# Patient Record
Sex: FEMALE | Race: OTHER | HISPANIC OR LATINO | URBAN - METROPOLITAN AREA
[De-identification: names, ages, dates, MRNs, and addresses within clinical notes are randomized per-mention and may not be internally consistent; named-entity substitution may affect disease eponyms.]

---

## 2024-09-11 ENCOUNTER — EMERGENCY (EMERGENCY)
Facility: HOSPITAL | Age: 24
LOS: 1 days | Discharge: HOME | End: 2024-09-11
Attending: EMERGENCY MEDICINE
Payer: COMMERCIAL

## 2024-09-11 VITALS
WEIGHT: 180.56 LBS | RESPIRATION RATE: 18 BRPM | DIASTOLIC BLOOD PRESSURE: 84 MMHG | OXYGEN SATURATION: 100 % | HEART RATE: 125 BPM | SYSTOLIC BLOOD PRESSURE: 135 MMHG | HEIGHT: 60 IN | TEMPERATURE: 98 F

## 2024-09-11 PROCEDURE — 99284 EMERGENCY DEPT VISIT MOD MDM: CPT

## 2024-09-11 RX ORDER — ACETAMINOPHEN 325 MG/1
1000 TABLET ORAL ONCE
Refills: 0 | Status: COMPLETED | OUTPATIENT
Start: 2024-09-11 | End: 2024-09-11

## 2024-09-11 RX ORDER — SODIUM CHLORIDE 9 MG/ML
1000 INJECTION INTRAMUSCULAR; INTRAVENOUS; SUBCUTANEOUS ONCE
Refills: 0 | Status: COMPLETED | OUTPATIENT
Start: 2024-09-11 | End: 2024-09-11

## 2024-09-11 RX ADMIN — ACETAMINOPHEN 400 MILLIGRAM(S): 325 TABLET ORAL at 23:59

## 2024-09-11 RX ADMIN — SODIUM CHLORIDE 1000 MILLILITER(S): 9 INJECTION INTRAMUSCULAR; INTRAVENOUS; SUBCUTANEOUS at 23:59

## 2024-09-11 NOTE — ED PROVIDER NOTE - DISCHARGE DATE
12-Sep-2024 Unna Boot Text: An Unna boot was placed to help immobilize the limb and facilitate more rapid healing.

## 2024-09-11 NOTE — ED PROVIDER NOTE - CLINICAL SUMMARY MEDICAL DECISION MAKING FREE TEXT BOX
24 year old female no PMhx present to ED for cough, congestion, body aches. Pt reports 2 days of tactile fevers, bodyaches with associated dry cough. Pt reports assocaited HA, last time took tylenol at 1700 today with mild relief.  Pt LMP june or july. Pt denies lightheadedness, dizziness, SOB, difficulty breathing, CP, abd pain, n/v/d, dysuria, hematuria.   Symptomatic control, re-asses 24 year old female no PMhx present to ED for cough, congestion, body aches. Pt reports 2 days of tactile fevers, bodyaches with associated dry cough. Pt reports assocaited HA, last time took tylenol at 1700 today with mild relief.  Pt LMP june or july. Pt denies lightheadedness, dizziness, SOB, difficulty breathing, CP, abd pain, n/v/d, dysuria, hematuria.   Symptomatic control, re-assess  Pt reassessed, pt feeling better at this time, vss, pt able to walk, talk and vocalized plan of action. Discussed in depth and explained to pt in depth the next steps that need to be taken including proper follow up with PCP or specialists. All incidental findings were discussed with pt as well. Pt verbalized their concerns and all questions were answered. Pt understands dispo and wants discharge. Given good instructions when to return to ED, strict return precautions and importance of f/u.

## 2024-09-12 VITALS
RESPIRATION RATE: 18 BRPM | TEMPERATURE: 98 F | HEART RATE: 100 BPM | SYSTOLIC BLOOD PRESSURE: 108 MMHG | DIASTOLIC BLOOD PRESSURE: 67 MMHG | OXYGEN SATURATION: 97 %

## 2024-09-12 LAB — HCG SERPL-ACNC: <4 MIU/ML — SIGNIFICANT CHANGE UP

## 2024-09-12 PROCEDURE — 99284 EMERGENCY DEPT VISIT MOD MDM: CPT | Mod: 25

## 2024-09-12 PROCEDURE — 36415 COLL VENOUS BLD VENIPUNCTURE: CPT

## 2024-09-12 PROCEDURE — 96374 THER/PROPH/DIAG INJ IV PUSH: CPT

## 2024-09-12 PROCEDURE — 84702 CHORIONIC GONADOTROPIN TEST: CPT

## 2024-09-12 RX ORDER — IBUPROFEN 600 MG
600 TABLET ORAL ONCE
Refills: 0 | Status: COMPLETED | OUTPATIENT
Start: 2024-09-12 | End: 2024-09-12

## 2024-09-12 RX ADMIN — Medication 600 MILLIGRAM(S): at 01:26

## 2025-01-01 ENCOUNTER — EMERGENCY (EMERGENCY)
Facility: HOSPITAL | Age: 25
LOS: 1 days | Discharge: HOME | End: 2025-01-01
Attending: EMERGENCY MEDICINE
Payer: COMMERCIAL

## 2025-01-01 VITALS
OXYGEN SATURATION: 100 % | SYSTOLIC BLOOD PRESSURE: 113 MMHG | TEMPERATURE: 99 F | HEART RATE: 98 BPM | RESPIRATION RATE: 20 BRPM | DIASTOLIC BLOOD PRESSURE: 70 MMHG | WEIGHT: 174.17 LBS

## 2025-01-01 PROCEDURE — T1013: CPT

## 2025-01-01 PROCEDURE — 99284 EMERGENCY DEPT VISIT MOD MDM: CPT

## 2025-01-01 PROCEDURE — 99283 EMERGENCY DEPT VISIT LOW MDM: CPT

## 2025-01-01 RX ORDER — ONDANSETRON 4 MG/1
4 TABLET ORAL ONCE
Refills: 0 | Status: COMPLETED | OUTPATIENT
Start: 2025-01-01 | End: 2025-01-01

## 2025-01-01 RX ORDER — ONDANSETRON 4 MG/1
1 TABLET ORAL
Qty: 9 | Refills: 0
Start: 2025-01-01 | End: 2025-01-03

## 2025-01-01 RX ADMIN — ONDANSETRON 4 MILLIGRAM(S): 4 TABLET ORAL at 22:38

## 2025-01-01 NOTE — ED ADULT TRIAGE NOTE - CHIEF COMPLAINT QUOTE
Patient presents to ED with c/o diarrhea times 3 days, generalized body aches and vomiting blood that started tonight at 1900.   No meds PTA

## 2025-01-01 NOTE — ED PROVIDER NOTE - ATTENDING CONTRIBUTION TO CARE
I performed a face to face bedside interview with patient regarding history of present illness, review of symptoms and past medical history. I completed an independent physical exam.  I have discussed patient's plan of care with resident.   I agree with note as stated above including HISTORY OF PRESENT ILLNESS, HIV, PAST MEDICAL/SURGICAL/FAMILY/SOCIAL HISTORY, ALLERGIES AND HOME MEDICATIONS, REVIEW OF SYSTEMS, PHYSICAL EXAM, MEDICAL DECISION MAKING and any PROGRESS NOTES during the time I functioned as the attending physician for this patient unless otherwise noted. My brief assessment is as follows: GENERAL: No acute distress, comfortably in bed  HEENT: Atraumatic, normocephalic, non-icteric, no blood visualized in posterior oropharynx, no conjunctival pallor, moist oral mucosa   NECK: Supple, full range of motion, no observable masses  NEURO: A&Ox3, no focal deficits, moving all extremities spontaneously, no dysarthria, CN II-XII grossly intact  PSYCH: Normal affect, appropriate insight and judgment, fluent speech  LUNGS: CTAB, no wrr, non-labored breathing  HEART: RRR, no murmur appreciated  ABD: Soft, non-tender, non-distended, no appreciable masses  EXTREMITIES: Nontender, no clubbing, cyanosis, or edema  SKIN: Warm and dry, no skin tenting

## 2025-01-01 NOTE — ED PROVIDER NOTE - PATIENT PORTAL LINK FT
You can access the FollowMyHealth Patient Portal offered by [facility] by registering at the following website: [email or web address]. By joining [facility]’s FollowMyHealth portal, you will also be able to view your health information using other applications (apps) compatible with our system.

## 2025-01-01 NOTE — ED ADULT NURSE NOTE - OBJECTIVE STATEMENT
Patient discharged by provider prior to RN assessment. Pt assessed and treated by provider, refer to provider notes.  Pt medicated prior to discharge. No sings of acute distress noted, respirations even and unlabored.

## 2025-01-01 NOTE — ED PROVIDER NOTE - CARE PLAN
Principal Discharge DX:	Nausea, vomiting and diarrhea  Secondary Diagnosis:	Vomiting of fresh blood   1

## 2025-01-01 NOTE — ED PROVIDER NOTE - CLINICAL SUMMARY MEDICAL DECISION MAKING FREE TEXT BOX
24y female with no reported past medical history presents with diarrhea and hematemesis. Reports flu like symptoms, including cough and chills, and non bloody non melenic diarrhea for the past 3 days. Vomited twice this evening with minimal bright red blood. Vital stable, no acute distress, well appearing, A&Ox3, no blood visualized in posterior oropharynx, no conjunctival pallor, not dehydrated. Will give zofran for n/v. Patient ready for discharge, return precautions given.

## 2025-01-01 NOTE — ED PROVIDER NOTE - OBJECTIVE STATEMENT
24y female with no reported past medical history presents with diarrhea and hematemesis. Reports flu like symptoms, including cough and chills, and non bloody non melenic diarrhea for the past 3 days. No abd pain. Vomited twice this evening with minimal bright red blood. Reports childrens are sick as well. Denies alcohol use. Denies asprin or NASIDs use. No prior GIB history. Denies fever, dizziness, dyspnea, chest pain,, dysuria.

## 2025-01-01 NOTE — ED PROVIDER NOTE - PHYSICAL EXAMINATION
Detail Level: Zone
GENERAL: No acute distress, comfortably in bed  HEENT: Atraumatic, normocephalic, non-icteric, no blood visualized in posterior oropharynx, no conjunctival pallor, moist oral mucosa   NECK: Supple, full range of motion, no observable masses  NEURO: A&Ox3, no focal deficits, moving all extremities spontaneously, no dysarthria, CN II-XII grossly intact  PSYCH: Normal affect, appropriate insight and judgment, fluent speech  LUNGS: CTAB, no wrr, non-labored breathing  HEART: RRR, no murmur appreciated  ABD: Soft, non-tender, non-distended, no appreciable masses  EXTREMITIES: Nontender, no clubbing, cyanosis, or edema  SKIN: Warm and dry, no skin tenting

## 2025-01-01 NOTE — ED PROVIDER NOTE - NSFOLLOWUPINSTRUCTIONS_ED_ALL_ED_FT
- Please return to the emergency room if your symptoms persists or worsens  - Please follow up with your PCP within 2 days  - Rest, drink plenty of fluids.  Advance activity as tolerated.     Nausea / Vomiting    Nausea is the feeling that you have to vomit. As nausea gets worse, it can lead to vomiting. Vomiting puts you at an increased risk for dehydration. Older adults and people with other diseases or a weak immune system are at higher risk for dehydration. Drink clear fluids in small but frequent amounts as tolerated. Eat bland, easy-to-digest foods in small amounts as tolerated.    SEEK IMMEDIATE MEDICAL CARE IF YOU HAVE ANY OF THE FOLLOWING SYMPTOMS: fever, inability to keep sufficient fluids down, black or bloody vomitus, black or bloody stools, lightheadedness/dizziness, chest pain, severe headache, rash, shortness of breath, cold or clammy skin, confusion, pain with urination, or any signs of dehydration.     Diarrhea    Diarrhea is frequent loose or watery bowel movements that has many causes. Diarrhea can make you feel weak and cause you to become dehydrated. Diarrhea typically lasts 2–3 days, but can last longer if it is a sign of something more serious. Drink clear fluids to prevent dehydration. Eat bland, easy-to-digest foods as tolerated.     SEEK IMMEDIATE MEDICAL CARE IF YOU HAVE ANY OF THE FOLLOWING SYMPTOMS: high fevers, lightheadedness/dizziness, chest pain, black or bloody stools, shortness of breath, severe abdominal or back pain, or any signs of dehydration.

## 2025-01-29 NOTE — ED PROVIDER NOTE - INTERPRETER'S NAME
The patient has a thyroid nodule, which is relatively small, and studies do not suggest a malignancy. I have recommended observation with follow-up with me for repeat ultrasound. I explained the pathology of thyroid nodules including the risks of cancer. The options of surgery were discussed, but the patient wants to pursue an observational course for now, which is reasonable. The patient wishes to continue to defer surgery at this time.    
gilberto